# Patient Record
Sex: MALE | Race: WHITE | Employment: STUDENT | URBAN - METROPOLITAN AREA
[De-identification: names, ages, dates, MRNs, and addresses within clinical notes are randomized per-mention and may not be internally consistent; named-entity substitution may affect disease eponyms.]

---

## 2017-02-25 ENCOUNTER — APPOINTMENT (OUTPATIENT)
Dept: CT IMAGING | Age: 22
End: 2017-02-25
Attending: EMERGENCY MEDICINE
Payer: COMMERCIAL

## 2017-02-25 ENCOUNTER — HOSPITAL ENCOUNTER (EMERGENCY)
Age: 22
Discharge: HOME | End: 2017-02-25
Attending: EMERGENCY MEDICINE
Payer: COMMERCIAL

## 2017-02-25 VITALS
DIASTOLIC BLOOD PRESSURE: 73 MMHG | HEART RATE: 119 BPM | SYSTOLIC BLOOD PRESSURE: 170 MMHG | TEMPERATURE: 98 F | BODY MASS INDEX: 29.16 KG/M2 | OXYGEN SATURATION: 97 % | WEIGHT: 220 LBS | HEIGHT: 73 IN | RESPIRATION RATE: 20 BRPM

## 2017-02-25 DIAGNOSIS — R10.9 ABDOMINAL PAIN, ACUTE: Primary | ICD-10-CM

## 2017-02-25 LAB
ALBUMIN SERPL-MCNC: 4.1 G/DL (ref 3.5–4.8)
ALP LIVER SERPL-CCNC: 41 U/L (ref 45–117)
ALT SERPL-CCNC: 36 U/L (ref 17–63)
AST SERPL-CCNC: 24 U/L (ref 15–41)
BASOPHILS # BLD AUTO: 0.04 X10(3) UL (ref 0–0.1)
BASOPHILS NFR BLD AUTO: 0.5 %
BILIRUB SERPL-MCNC: 0.5 MG/DL (ref 0.1–2)
BILIRUB UR QL STRIP.AUTO: NEGATIVE
BUN BLD-MCNC: 15 MG/DL (ref 8–20)
CALCIUM BLD-MCNC: 8.9 MG/DL (ref 8.3–10.3)
CHLORIDE: 106 MMOL/L (ref 101–111)
CLARITY UR REFRACT.AUTO: CLEAR
CO2: 26 MMOL/L (ref 22–32)
COLOR UR AUTO: YELLOW
CREAT BLD-MCNC: 1.07 MG/DL (ref 0.7–1.3)
EOSINOPHIL # BLD AUTO: 0.11 X10(3) UL (ref 0–0.3)
EOSINOPHIL NFR BLD AUTO: 1.3 %
ERYTHROCYTE [DISTWIDTH] IN BLOOD BY AUTOMATED COUNT: 12.1 % (ref 11.5–16)
GLUCOSE BLD-MCNC: 99 MG/DL (ref 70–99)
GLUCOSE UR STRIP.AUTO-MCNC: NEGATIVE MG/DL
HCT VFR BLD AUTO: 47.2 % (ref 37–53)
HGB BLD-MCNC: 16.6 G/DL (ref 13–17)
IMMATURE GRANULOCYTE COUNT: 0.02 X10(3) UL (ref 0–1)
IMMATURE GRANULOCYTE RATIO %: 0.2 %
KETONES UR STRIP.AUTO-MCNC: NEGATIVE MG/DL
LEUKOCYTE ESTERASE UR QL STRIP.AUTO: NEGATIVE
LYMPHOCYTES # BLD AUTO: 3.41 X10(3) UL (ref 0.9–4)
LYMPHOCYTES NFR BLD AUTO: 39.1 %
M PROTEIN MFR SERPL ELPH: 7.3 G/DL (ref 6.1–8.3)
MCH RBC QN AUTO: 29.9 PG (ref 27–33.2)
MCHC RBC AUTO-ENTMCNC: 35.2 G/DL (ref 31–37)
MCV RBC AUTO: 85 FL (ref 80–99)
MONOCYTES # BLD AUTO: 0.53 X10(3) UL (ref 0.1–0.6)
MONOCYTES NFR BLD AUTO: 6.1 %
MORPHOLOGY: NORMAL
NEUTROPHIL ABS PRELIM: 4.61 X10 (3) UL (ref 1.3–6.7)
NEUTROPHILS # BLD AUTO: 4.61 X10(3) UL (ref 1.3–6.7)
NEUTROPHILS NFR BLD AUTO: 52.8 %
NITRITE UR QL STRIP.AUTO: NEGATIVE
PH UR STRIP.AUTO: 5.5 [PH] (ref 4.5–8)
PLATELET # BLD AUTO: 281 10(3)UL (ref 150–450)
PLATELET MORPHOLOGY: NORMAL
POTASSIUM SERPL-SCNC: 3.8 MMOL/L (ref 3.6–5.1)
PROT UR STRIP.AUTO-MCNC: NEGATIVE MG/DL
RBC # BLD AUTO: 5.55 X10(6)UL (ref 4.3–5.7)
RBC UR QL AUTO: NEGATIVE
RED CELL DISTRIBUTION WIDTH-SD: 37.6 FL (ref 35.1–46.3)
SODIUM SERPL-SCNC: 140 MMOL/L (ref 136–144)
SP GR UR STRIP.AUTO: >=1.03 (ref 1–1.03)
UROBILINOGEN UR STRIP.AUTO-MCNC: 0.2 MG/DL
WBC # BLD AUTO: 8.7 X10(3) UL (ref 4–13)

## 2017-02-25 PROCEDURE — 99284 EMERGENCY DEPT VISIT MOD MDM: CPT

## 2017-02-25 PROCEDURE — 96360 HYDRATION IV INFUSION INIT: CPT

## 2017-02-25 PROCEDURE — 80053 COMPREHEN METABOLIC PANEL: CPT | Performed by: EMERGENCY MEDICINE

## 2017-02-25 PROCEDURE — 96361 HYDRATE IV INFUSION ADD-ON: CPT

## 2017-02-25 PROCEDURE — 81003 URINALYSIS AUTO W/O SCOPE: CPT | Performed by: EMERGENCY MEDICINE

## 2017-02-25 PROCEDURE — 85025 COMPLETE CBC W/AUTO DIFF WBC: CPT | Performed by: EMERGENCY MEDICINE

## 2017-02-25 PROCEDURE — 74176 CT ABD & PELVIS W/O CONTRAST: CPT

## 2017-02-25 RX ORDER — SODIUM CHLORIDE 9 MG/ML
INJECTION, SOLUTION INTRAVENOUS ONCE
Status: COMPLETED | OUTPATIENT
Start: 2017-02-25 | End: 2017-02-25

## 2017-02-25 NOTE — ED PROVIDER NOTES
Patient Seen in: THE [facility] Emergency Department In [place]    History   Patient presents with:  Abdomen/Flank Pain (GI/)    Stated Complaint: right lower quadrant pain    HPI    19-year-old male presents to the emergency department complaining of const without extra sounds or murmurs. Regular rate and rhythm. Abdomen: Normoactive bowel sounds. Flat, soft with minimal right lower quadrant tenderness without masses rebound or guarding. Skin is dry without rashes or lesions. Extremities are atraumatic.

## 2021-09-15 ENCOUNTER — HOSPITAL ENCOUNTER (EMERGENCY)
Age: 26
Discharge: HOME | End: 2021-09-15
Attending: EMERGENCY MEDICINE
Payer: COMMERCIAL

## 2021-09-15 VITALS
RESPIRATION RATE: 18 BRPM | TEMPERATURE: 99 F | HEIGHT: 73 IN | HEART RATE: 98 BPM | SYSTOLIC BLOOD PRESSURE: 153 MMHG | DIASTOLIC BLOOD PRESSURE: 86 MMHG | OXYGEN SATURATION: 96 % | BODY MASS INDEX: 39.76 KG/M2 | WEIGHT: 300 LBS

## 2021-09-15 DIAGNOSIS — T14.8XXA SKIN AVULSION: Primary | ICD-10-CM

## 2021-09-15 PROCEDURE — 99282 EMERGENCY DEPT VISIT SF MDM: CPT

## 2021-09-15 PROCEDURE — 12001 RPR S/N/AX/GEN/TRNK 2.5CM/<: CPT

## 2021-09-15 PROCEDURE — 99283 EMERGENCY DEPT VISIT LOW MDM: CPT

## 2021-09-15 RX ORDER — SERTRALINE HYDROCHLORIDE 100 MG/1
100 TABLET, FILM COATED ORAL DAILY
COMMUNITY

## 2021-09-16 NOTE — ED INITIAL ASSESSMENT (HPI)
Patient presents with complaints of right big toe avulsion after running into side of table. Bleeding controlled.  UTD on tetanus per patient

## 2021-09-16 NOTE — ED PROVIDER NOTES
Patient Seen in: THE [facility] Emergency Department In [place]      History   Patient presents with:  Laceration/Abrasion    Stated Complaint: Skinned Right foot #5 Digit    Subjective:   HPI    55-year-old male.   This afternoon the patient kicked the side o extends 1.5 cm. Laceration occurred this afternoon. It has been roughly 8 hours since it occurred. Early healing changes are noted. Site was thoroughly irrigated and the site was sterilized.   Surgical glue was applied to the skin tear and the skin av

## (undated) NOTE — ED AVS SNAPSHOT
THE [facility] Emergency Department in [address] [facility]    Phone:  [phone]    Fax:  [phone]           Vicky Jackson   MRN: [id number]    Department:  THE [facility] Emergency Department in [place]   Date o If you have any problems with your follow-up, please call our  at [phone]    Si usted tiene algun problema con gurrola sequimiento, por favor llame a nuestro adminstrador de casos al [phone]    Expect to receive an electronic request Xuan Shannon [address] [facility] ([address]) [address] [facility] [address] ([address]) [address]  [address] [facility] Dictated by: Den Saavedra MD on 2/25/2017 at 19:03       Approved by: Den Saavedra MD              Narrative:    PROCEDURE:  CT APPENDIX ABD/PEL WO CONTRAST (CPT=74176)     COMPARISON:  None.      INDICATIONS:  eval for appendicitis     TECHNIQUE:  Noncontrast mu [employer] will allow you to access patient instructions from your recent visit,  view other health information, and more. To sign up or find more information, go to [email or web address] and click on the Sign Up Now link in the [employer] box.      Enter

## (undated) NOTE — ED AVS SNAPSHOT
[facility] Emergency Department in [facility] [address] [facility]    Phone:  [phone]    Fax:  [phone]           Mr. Subha Collado   MRN: [id number]    Department:  [facility] Emergency Department in [place]   Date o IF THERE IS ANY CHANGE OR WORSENING OF YOUR CONDITION, CALL YOUR PRIMARY CARE PHYSICIAN AT ONCE OR RETURN IMMEDIATELY TO THE EMERGENCY DEPARTMENT.     If you have been prescribed any medication(s), please fill your prescription right away and begin taking